# Patient Record
Sex: MALE | Race: BLACK OR AFRICAN AMERICAN | Employment: UNEMPLOYED | URBAN - METROPOLITAN AREA
[De-identification: names, ages, dates, MRNs, and addresses within clinical notes are randomized per-mention and may not be internally consistent; named-entity substitution may affect disease eponyms.]

---

## 2024-04-27 ENCOUNTER — HOSPITAL ENCOUNTER (INPATIENT)
Facility: HOSPITAL | Age: 54
LOS: 3 days | Discharge: HOME | End: 2024-04-30
Attending: STUDENT IN AN ORGANIZED HEALTH CARE EDUCATION/TRAINING PROGRAM | Admitting: INTERNAL MEDICINE
Payer: MEDICARE

## 2024-04-27 DIAGNOSIS — D86.9 SARCOIDOSIS: ICD-10-CM

## 2024-04-27 DIAGNOSIS — M25.50 POLYARTHRALGIA: Primary | ICD-10-CM

## 2024-04-27 LAB
ALBUMIN SERPL-MCNC: 4.2 G/DL (ref 3.2–4.8)
ALBUMIN/GLOB SERPL: 1.3 {RATIO} (ref 1–2)
ALP LIVER SERPL-CCNC: 100 U/L
ALT SERPL-CCNC: 56 U/L
ANION GAP SERPL CALC-SCNC: 4 MMOL/L (ref 0–18)
AST SERPL-CCNC: 77 U/L (ref ?–34)
BASOPHILS # BLD AUTO: 0.01 X10(3) UL (ref 0–0.2)
BASOPHILS NFR BLD AUTO: 0.1 %
BILIRUB SERPL-MCNC: 0.7 MG/DL (ref 0.3–1.2)
BUN BLD-MCNC: 21 MG/DL (ref 9–23)
BUN/CREAT SERPL: 15.4 (ref 10–20)
CALCIUM BLD-MCNC: 9.5 MG/DL (ref 8.7–10.4)
CHLORIDE SERPL-SCNC: 105 MMOL/L (ref 98–112)
CK SERPL-CCNC: 713 U/L
CO2 SERPL-SCNC: 30 MMOL/L (ref 21–32)
CREAT BLD-MCNC: 1.36 MG/DL
DEPRECATED RDW RBC AUTO: 40.1 FL (ref 35.1–46.3)
EGFRCR SERPLBLD CKD-EPI 2021: 63 ML/MIN/1.73M2 (ref 60–?)
EOSINOPHIL # BLD AUTO: 0.08 X10(3) UL (ref 0–0.7)
EOSINOPHIL NFR BLD AUTO: 1.1 %
ERYTHROCYTE [DISTWIDTH] IN BLOOD BY AUTOMATED COUNT: 12.8 % (ref 11–15)
ERYTHROCYTE [SEDIMENTATION RATE] IN BLOOD: 25 MM/HR
GLOBULIN PLAS-MCNC: 3.3 G/DL (ref 2.8–4.4)
GLUCOSE BLD-MCNC: 106 MG/DL (ref 70–99)
GLUCOSE BLDC GLUCOMTR-MCNC: 110 MG/DL (ref 70–99)
HCT VFR BLD AUTO: 44.2 %
HGB BLD-MCNC: 14 G/DL
IMM GRANULOCYTES # BLD AUTO: 0.03 X10(3) UL (ref 0–1)
IMM GRANULOCYTES NFR BLD: 0.4 %
LYMPHOCYTES # BLD AUTO: 1.39 X10(3) UL (ref 1–4)
LYMPHOCYTES NFR BLD AUTO: 19.8 %
MCH RBC QN AUTO: 27.1 PG (ref 26–34)
MCHC RBC AUTO-ENTMCNC: 31.7 G/DL (ref 31–37)
MCV RBC AUTO: 85.5 FL
MONOCYTES # BLD AUTO: 1 X10(3) UL (ref 0.1–1)
MONOCYTES NFR BLD AUTO: 14.2 %
NEUTROPHILS # BLD AUTO: 4.51 X10 (3) UL (ref 1.5–7.7)
NEUTROPHILS # BLD AUTO: 4.51 X10(3) UL (ref 1.5–7.7)
NEUTROPHILS NFR BLD AUTO: 64.4 %
OSMOLALITY SERPL CALC.SUM OF ELEC: 291 MOSM/KG (ref 275–295)
PLATELET # BLD AUTO: 184 10(3)UL (ref 150–450)
POTASSIUM SERPL-SCNC: 4.5 MMOL/L (ref 3.5–5.1)
PROT SERPL-MCNC: 7.5 G/DL (ref 5.7–8.2)
RBC # BLD AUTO: 5.17 X10(6)UL
SODIUM SERPL-SCNC: 139 MMOL/L (ref 136–145)
TROPONIN I SERPL HS-MCNC: 8 NG/L
WBC # BLD AUTO: 7 X10(3) UL (ref 4–11)

## 2024-04-27 RX ORDER — MORPHINE SULFATE 4 MG/ML
4 INJECTION, SOLUTION INTRAMUSCULAR; INTRAVENOUS ONCE
Status: COMPLETED | OUTPATIENT
Start: 2024-04-27 | End: 2024-04-27

## 2024-04-27 RX ORDER — METHYLPREDNISOLONE SODIUM SUCCINATE 125 MG/2ML
125 INJECTION, POWDER, LYOPHILIZED, FOR SOLUTION INTRAMUSCULAR; INTRAVENOUS ONCE
Status: COMPLETED | OUTPATIENT
Start: 2024-04-27 | End: 2024-04-27

## 2024-04-27 RX ORDER — LATANOPROST 50 UG/ML
1 SOLUTION/ DROPS OPHTHALMIC NIGHTLY
COMMUNITY
Start: 2024-03-11 | End: 2027-02-24

## 2024-04-27 RX ORDER — NIFEDIPINE 30 MG
30 TABLET, EXTENDED RELEASE ORAL DAILY
COMMUNITY
Start: 2024-01-02

## 2024-04-27 NOTE — ED PROVIDER NOTES
Patient Seen in: [facility] Emergency Department      History     Chief Complaint   Patient presents with    Syncope     Stated Complaint:     Subjective:   HPI    52 yo male with hx sarcoidosis, hypertension hyperlipidemia PE on Xarelto presenting for evaluation of a syncopal episode diffuse bodyaches and pains.  He is brought in by EMS from the scene.  He was at [employer] and felt very lightheaded and laid back.  He denies actual loss of consciousness.  States that over the past day he has had increasing diffuse extremity pain.  Mostly in shoulders and bilateral legs.  Difficulty walking due to his symptoms.  Denies headache blurry vision vomiting or numbness or weakness or tingling.  On pred 10mg/5mg alternating daily and rituxan infusions for sarcoid with multisystem involvement. Follows with rheum at [facility]    Objective:   Past Medical History:    Essential hypertension    History of blood clots    Hyperlipidemia    Pulmonary embolism (HCC)    Sarcoidosis, unspecified              History reviewed. No pertinent surgical history.             Social History     Socioeconomic History    Marital status: Single   Tobacco Use    Smoking status: Former     Types: Cigarettes    Smokeless tobacco: Never   Substance and Sexual Activity    Alcohol use: Not Currently    Drug use: Not Currently              Review of Systems    Positive for stated complaint:   Other systems are as noted in HPI.  Constitutional and vital signs reviewed.      All other systems reviewed and negative except as noted above.    Physical Exam     ED Triage Vitals   BP 04/27/24 1657 (!) 138/101   Pulse 04/27/24 1657 90   Resp 04/27/24 1657 17   Temp 04/27/24 1658 98.2 °F (36.8 °C)   Temp src 04/27/24 1658 Oral   SpO2 04/27/24 1657 94 %   O2 Device 04/27/24 1657 None (Room air)       Current:/80   Pulse 104   Temp 98.2 °F (36.8 °C) (Oral)   Resp 21   Ht 182.9 cm (6')   Wt (!) 142.9 kg   SpO2 100%   BMI 42.72 kg/m²          Physical Exam    Constitutional: awake, alert, no sig distress  HENT: mmm, no lesions,  Neck: normal range of motion, no tenderness, supple.  Eyes: PERRL, EOMI, conjunctiva normal, no discharge. Sclera anicteric.  Cardiovascular: rr no murmur  Respiratory: Normal breath sounds, no respiratory distress, no wheezing, no chest tenderness.  GI: Bowel sounds normal, Soft, no tenderness, no masses, no pulsatile masses.  : No CVA tenderness.  Skin: Warm, dry, no erythema, no rash.  Musculoskeletal: Intact distal pulses, no edema, no tenderness, no cyanosis, no clubbing. Good range of motion in all major joints. No tenderness to palpation or major deformities noted. Back- No tenderness.  Neurologic: Alert & oriented x 3, normal motor function, normal sensory function, no focal deficits noted.  Psych: Calm, cooperative, nl affect        ED Course     Labs Reviewed   COMP METABOLIC PANEL (14) - Abnormal; Notable for the following components:       Result Value    Glucose 106 (*)     Creatinine 1.36 (*)     ALT 56 (*)     AST 77 (*)     All other components within normal limits   CK CREATINE KINASE (NOT CREATININE) - Abnormal; Notable for the following components:     (*)     All other components within normal limits   SED RATE, WESTERGREN (AUTOMATED) - Abnormal; Notable for the following components:    Sed Rate 25 (*)     All other components within normal limits   POCT GLUCOSE - Abnormal; Notable for the following components:    POC Glucose  110 (*)     All other components within normal limits   TROPONIN I HIGH SENSITIVITY - Normal   CBC WITH DIFFERENTIAL WITH PLATELET    Narrative:     The following orders were created for panel order CBC With Differential With Platelet.  Procedure                               Abnormality         Status                     ---------                               -----------         ------                     CBC W/ DIFFERENTIAL[[id number]]                              Final  result                 Please view results for these tests on the individual orders.   RAINBOW DRAW LAVENDER   RAINBOW DRAW LIGHT GREEN   RAINBOW DRAW BLUE   RAINBOW DRAW GOLD   CBC W/ DIFFERENTIAL     EKG    Rate, intervals and axes as noted on EKG Report.  Rate: 86  Rhythm: Sinus Rhythm  Reading: SR w/ PACS, normal axis/intervals, no st or t wave changes, no stemi                             MDM      53-year-old male with history as document above presenting with diffuse bodyaches and joint pains and a near syncopal episode.  On arrival vitals are stable and reassuring.  Patient has significant pain the appearance of some weakness mostly in the shoulder and hip girdle.  This raises concern for polymyalgia rheumatica versus sarcoid, versus myositis or rhabdomyolysis  Plan labs and troponin and CPK and reassess after fluids and morphine  Admission disposition: 4/27/2024  9:08 PM           Significant pain persist after morphine and fluids, unable to ambulate.  Given amatory dysfunction we will opt for admission.  Discussed with rheumatology recommends Solu-Medrol IV and repeat CPK and aldolase in AM.  Discussed with hospitalist as well.                             Medical Decision Making      Disposition and Plan     Clinical Impression:  1. Polyarthralgia    2. Sarcoidosis         Disposition:  Admit  4/27/2024  9:08 pm    Follow-up:  No follow-up provider specified.        Medications Prescribed:  There are no discharge medications for this patient.                        Hospital Problems       Present on Admission             ICD-10-CM Noted POA    Polyarthralgia M25.50 4/27/2024 Unknown

## 2024-04-27 NOTE — ED INITIAL ASSESSMENT (HPI)
Pt presents via EMS s/p feeling dizzy and weak while at [employer]. Pt also reports extreme pain with raising bilateral arms xtoday. Pt reports syncopal episode lasting approximately one minute, denies head injury.  +coumadin use

## 2024-04-28 LAB
ANION GAP SERPL CALC-SCNC: 7 MMOL/L (ref 0–18)
ATRIAL RATE: 86 BPM
BUN BLD-MCNC: 15 MG/DL (ref 9–23)
BUN/CREAT SERPL: 15.3 (ref 10–20)
CALCIUM BLD-MCNC: 9.4 MG/DL (ref 8.7–10.4)
CHLORIDE SERPL-SCNC: 104 MMOL/L (ref 98–112)
CK SERPL-CCNC: 412 U/L
CK SERPL-CCNC: 729 U/L
CO2 SERPL-SCNC: 25 MMOL/L (ref 21–32)
CREAT BLD-MCNC: 0.98 MG/DL
CRP SERPL-MCNC: 17.5 MG/DL (ref ?–1)
EGFRCR SERPLBLD CKD-EPI 2021: 93 ML/MIN/1.73M2 (ref 60–?)
GLUCOSE BLD-MCNC: 163 MG/DL (ref 70–99)
LDH SERPL L TO P-CCNC: 275 U/L
OSMOLALITY SERPL CALC.SUM OF ELEC: 286 MOSM/KG (ref 275–295)
P AXIS: 55 DEGREES
P-R INTERVAL: 140 MS
POTASSIUM SERPL-SCNC: 4.3 MMOL/L (ref 3.5–5.1)
Q-T INTERVAL: 330 MS
QRS DURATION: 72 MS
QTC CALCULATION (BEZET): 394 MS
R AXIS: 65 DEGREES
RHEUMATOID FACT SERPL-ACNC: <10 IU/ML (ref ?–14)
SODIUM SERPL-SCNC: 136 MMOL/L (ref 136–145)
T AXIS: 74 DEGREES
VENTRICULAR RATE: 86 BPM

## 2024-04-28 PROCEDURE — 99223 1ST HOSP IP/OBS HIGH 75: CPT | Performed by: INTERNAL MEDICINE

## 2024-04-28 RX ORDER — LATANOPROST 50 UG/ML
1 SOLUTION/ DROPS OPHTHALMIC NIGHTLY
Status: DISCONTINUED | OUTPATIENT
Start: 2024-04-28 | End: 2024-04-30

## 2024-04-28 RX ORDER — DIPHENHYDRAMINE HCL 25 MG
50 CAPSULE ORAL EVERY 8 HOURS PRN
Status: DISCONTINUED | OUTPATIENT
Start: 2024-04-28 | End: 2024-04-30

## 2024-04-28 RX ORDER — NIFEDIPINE 30 MG/1
30 TABLET, EXTENDED RELEASE ORAL DAILY
Status: DISCONTINUED | OUTPATIENT
Start: 2024-04-28 | End: 2024-04-30

## 2024-04-28 RX ORDER — PANTOPRAZOLE SODIUM 40 MG/1
40 TABLET, DELAYED RELEASE ORAL
Status: DISCONTINUED | OUTPATIENT
Start: 2024-04-29 | End: 2024-04-30

## 2024-04-28 RX ORDER — METHYLPREDNISOLONE SODIUM SUCCINATE 125 MG/2ML
60 INJECTION, POWDER, LYOPHILIZED, FOR SOLUTION INTRAMUSCULAR; INTRAVENOUS EVERY 12 HOURS
Status: DISCONTINUED | OUTPATIENT
Start: 2024-04-28 | End: 2024-04-30

## 2024-04-28 RX ORDER — ACETAMINOPHEN 500 MG
1000 TABLET ORAL EVERY 8 HOURS PRN
Status: DISCONTINUED | OUTPATIENT
Start: 2024-04-28 | End: 2024-04-30

## 2024-04-28 RX ORDER — PREDNISONE 5 MG/1
5 TABLET ORAL DAILY
Status: ON HOLD | COMMUNITY
End: 2024-04-28

## 2024-04-28 RX ORDER — PANTOPRAZOLE SODIUM 40 MG/1
40 TABLET, DELAYED RELEASE ORAL
COMMUNITY

## 2024-04-28 RX ORDER — HYDROCODONE BITARTRATE AND ACETAMINOPHEN 5; 325 MG/1; MG/1
1 TABLET ORAL EVERY 6 HOURS PRN
Status: DISCONTINUED | OUTPATIENT
Start: 2024-04-28 | End: 2024-04-30

## 2024-04-28 NOTE — PHYSICAL THERAPY NOTE
PHYSICAL THERAPY EVALUATION - INPATIENT     Room Number: 543/543-A  Evaluation Date: 4/28/2024  Type of Evaluation: Initial   Physician Order: PT Eval and Treat    Presenting Problem: sacroidosis flare     Reason for Therapy: Mobility Dysfunction and Discharge Planning    PHYSICAL THERAPY ASSESSMENT   Patient is a 53 year old male admitted 4/27/2024 for polyarthaglia due to sacrcoidosis flare.  Prior to admission, patient's baseline is modif independent.  Patient is currently functioning below baseline with bed mobility, transfers, gait, stair negotiation, standing prolonged periods, and performing household tasks.  Patient is requiring maximum assist as a result of the following impairments: decreased functional strength, decreased endurance/aerobic capacity, pain, impaired standing balance, impaired motor planning, and decreased muscular endurance.  Physical Therapy will continue to follow for duration of hospitalization.    Patient will benefit from continued skilled PT Services to promote return to prior level of function and safety with continuous assistance and gradual rehabilitative therapy .    PLAN  PT Treatment Plan: Bed mobility;Energy conservation;Gait training  Rehab Potential : Fair  Frequency (Obs): 5x/week    PHYSICAL THERAPY MEDICAL/SOCIAL HISTORY   History related to current admission: EMS admit with syncope, weakkness.  C/o BUE and BLE pain.  Hx chronic RLE weakness- was to start OP PT next week     Problem List  Principal Problem:    Polyarthralgia  Active Problems:    Sarcoidosis      HOME SITUATION  Home Situation  Type of Home: Apartment  Stairs to Enter : 4  Lives With: Significant other  Drives: No  Patient Owned Equipment: Cane     Prior Level of [place]: lives with GF in 4 step apt. Has cane.  Unable to drive. Reports chronic RLE weakness.  On disability and gen weakness but BUE and BLE pain is new.  Reports 4 falls over last 6 months and fear of falling.    SUBJECTIVE  My body  mihaela    PHYSICAL THERAPY EXAMINATION   OBJECTIVE  Precautions: Limb alert - left;Limb alert - right  Fall Risk: High fall risk    WEIGHT BEARING RESTRICTION                   PAIN ASSESSMENT  Ratin  Location: BUE, BLE  Management Techniques: Activity promotion    COGNITION  Overall Cognitive Status:  WFL - within functional limits    RANGE OF MOTION AND STRENGTH ASSESSMENT  Upper extremity ROM and strength are within functional limits except for the following:  BUE  Lower extremity ROM is within functional limits except for the following: BLE  Lower extremity strength is within functional limits except for the following:  BLE    BALANCE  Static Sitting: Fair  Dynamic Sitting: Fair -  Static Standing: Poor  Dynamic Standing: Poor -    ADDITIONAL TESTS                                    NEUROLOGICAL FINDINGS                      ACTIVITY TOLERANCE                         O2 WALK       AM-PAC '6-Clicks' INPATIENT SHORT FORM - BASIC MOBILITY  How much difficulty does the patient currently have...  Patient Difficulty: Turning over in bed (including adjusting bedclothes, sheets and blankets)?: A Lot   Patient Difficulty: Sitting down on and standing up from a chair with arms (e.g., wheelchair, bedside commode, etc.): A Lot   Patient Difficulty: Moving from lying on back to sitting on the side of the bed?: A Lot   How much help from another person does the patient currently need...   Help from Another: Moving to and from a bed to a chair (including a wheelchair)?: A Lot   Help from Another: Need to walk in hospital room?: A Lot   Help from Another: Climbing 3-5 steps with a railing?: Total     AM-PAC Score:  Raw Score: 11   Approx Degree of Impairment: 72.57%   Standardized Score (AM-PAC Scale): 33.86   CMS Modifier (G-Code): CL    FUNCTIONAL ABILITY STATUS  Functional Mobility/Gait Assessment  Gait Assistance: Maximum assistance  Distance (ft): 5  Assistive Device: Rolling walker  Pattern: Shuffle  Rolling: maximum  assist  Supine to Sit: maximum assist  Sit to Supine: maximum assist  Sit to Stand: maximum assist    Exercise/Education Provided:  Bed mobility  Body mechanics  Energy conservation  Gait training  Neuromuscular re-educate  ROM  Transfer training    The patient's Approx Degree of Impairment: 72.57% has been calculated based on documentation in the [facility] '6 clicks' Inpatient Basic Mobility Short Form.  Research supports that patients with this level of impairment may benefit from GR  - patient has high fall risk with 4 falls over last 6 months, was able to walk with cane, now unable to walk > 5 ft with max A for all ADLs.  Final disposition will be made by interdisciplinary medical team.    Patient End of Session: Up in chair;Needs met;Call light within reach;RN aware of session/findings;All patient questions and concerns addressed;Alarm set    CURRENT GOALS  Goals to be met by: 5/5/2024  Patient Goal Patient's self-stated goal is: to get stronger   Goal #1 Patient is able to demonstrate supine - sit EOB @ level: independent     Goal #1   Current Status    Goal #2 Patient is able to demonstrate transfers EOB to/from [facility] at assistance level: independent with none     Goal #2  Current Status    Goal #3 Patient is able to ambulate 400 feet with assist device: walker - rolling at assistance level: modified independent   Goal #3   Current Status    Goal #4 Patient will negotiate 4 stairs/one curb w/ assistive device and supervision   Goal #4   Current Status    Goal #5 Patient to demonstrate independence with home activity/exercise instructions provided to patient in preparation for discharge.   Goal #5   Current Status    Goal #6    Goal #6  Current Status      Patient Evaluation Complexity Level:  History Moderate - 1 or 2 personal factors and/or co-morbidities   Examination of body systems Moderate - addressing a total of 3 or more elements   Clinical Presentation  Moderate - Evolving   Clinical Decision Making   Moderate Complexity     Gait Trainin minutes  Therapeutic Activity:  30 minutes  Neuromuscular Re-education: 0 minutes  Therapeutic Exercise: 0 minutes  [place] Repositionin minutes  Manual Therapy: 0 minutes  Can add/delete any of these

## 2024-04-28 NOTE — PLAN OF CARE
Problem: Patient Centered Care  Goal: Patient preferences are identified and integrated in the patient's plan of care  Description: Interventions:  - What would you like us to know as we care for you? I live home alone  - Provide timely, complete, and accurate information to patient/family  - Incorporate patient and family knowledge, values, beliefs, and cultural backgrounds into the planning and delivery of care  - Encourage patient/family to participate in care and decision-making at the level they choose  - Honor patient and family perspectives and choices  Outcome: Progressing      Problem: PAIN - ADULT  Goal: Verbalizes/displays adequate comfort level or patient's stated pain goal  Description: INTERVENTIONS:  - Encourage pt to monitor pain and request assistance  - Assess pain using appropriate pain scale  - Administer analgesics based on type and severity of pain and evaluate response  - Implement non-pharmacological measures as appropriate and evaluate response  - Consider cultural and social influences on pain and pain management  - Manage/alleviate anxiety  - Utilize distraction and/or relaxation techniques  - Monitor for opioid side effects  - Notify MD/LIP if interventions unsuccessful or patient reports new pain  - Anticipate increased pain with activity and pre-medicate as appropriate  Outcome: Progressing     Problem: SAFETY ADULT - FALL  Goal: Free from fall injury  Description: INTERVENTIONS:  - Assess pt frequently for physical needs  - Identify cognitive and physical deficits and behaviors that affect risk of falls.  - [place] fall precautions as indicated by assessment.  - Educate pt/family on patient safety including physical limitations  - Instruct pt to call for assistance with activity based on assessment  - Modify environment to reduce risk of injury  - Provide assistive devices as appropriate  - Consider OT/PT consult to assist with strengthening/mobility  - Encourage toileting  schedule  Outcome: Progressing

## 2024-04-28 NOTE — PLAN OF CARE
Pt admitted from ED, sister at bedside. Per pt he normally ambulates with a cane. Complains of generalized weakness. Pt does not remember what meds he takes, med req not completed d/t pharmacy being closed, pt not remembering what he takes. MD notified. AM RN notified. Safety precautions in place, call light within reach.     Problem: Patient Centered Care  Goal: Patient preferences are identified and integrated in the patient's plan of care  Description: Interventions:  - What would you like us to know as we care for you? I live home alone  - Provide timely, complete, and accurate information to patient/family  - Incorporate patient and family knowledge, values, beliefs, and cultural backgrounds into the planning and delivery of care  - Encourage patient/family to participate in care and decision-making at the level they choose  - Honor patient and family perspectives and choices  Outcome: Progressing     Problem: Patient/Family Goals  Goal: Patient/Family Long Term Goal  Description: Patient's Long Term Goal:     Interventions:  -   - See additional Care Plan goals for specific interventions  Outcome: Progressing  Goal: Patient/Family Short Term Goal  Description: Patient's Short Term Goal:     Interventions:   - - See additional Care Plan goals for specific interventions  Outcome: Progressing     Problem: PAIN - ADULT  Goal: Verbalizes/displays adequate comfort level or patient's stated pain goal  Description: INTERVENTIONS:  - Encourage pt to monitor pain and request assistance  - Assess pain using appropriate pain scale  - Administer analgesics based on type and severity of pain and evaluate response  - Implement non-pharmacological measures as appropriate and evaluate response  - Consider cultural and social influences on pain and pain management  - Manage/alleviate anxiety  - Utilize distraction and/or relaxation techniques  - Monitor for opioid side effects  - Notify MD/LIP if interventions unsuccessful or  patient reports new pain  - Anticipate increased pain with activity and pre-medicate as appropriate  Outcome: Progressing     Problem: SAFETY ADULT - FALL  Goal: Free from fall injury  Description: INTERVENTIONS:  - Assess pt frequently for physical needs  - Identify cognitive and physical deficits and behaviors that affect risk of falls.  - [place] fall precautions as indicated by assessment.  - Educate pt/family on patient safety including physical limitations  - Instruct pt to call for assistance with activity based on assessment  - Modify environment to reduce risk of injury  - Provide assistive devices as appropriate  - Consider OT/PT consult to assist with strengthening/mobility  - Encourage toileting schedule  Outcome: Progressing

## 2024-04-28 NOTE — ED QUICK NOTES
Orders for admission, patient is aware of plan and ready to go upstairs. Any questions, please call ED RN Adriana at extension [phone].     Patient Covid vaccination status: Fully vaccinated     COVID Test Ordered in ED: None    COVID Suspicion at Admission: N/A    Running Infusions:      Mental Status/LOC at time of transport: AOx4    Other pertinent information: from home,   CIWA score: N/A   NIH score:  N/A

## 2024-04-28 NOTE — H&P
[facility]  part of [facility]    History and Physical    Srinivasan Shin Patient Status:  Inpatient    10/3/1970 MRN [id number]   Location [facility] 5SW/SE Attending Stalin Pang MD   Hosp Day # 1 PCP No primary care provider on file.     Date:  2024  Date of Admission:  2024    History provided by:patient  HPI:     Chief Complaint   Patient presents with    Syncope     52 yo male with hx sarcoidosis, hypertension hyperlipidemia PE on Xarelto presenting for evaluation of a syncopal episode diffuse bodyaches and pains.  Patient  felt very lightheaded and laid back.  He denies actual loss of consciousness.  Patient c/o diffuse body aches  mainly  in shoulders and bilateral legs.  Difficulty walking due to his symptoms.  Denies headache blurry vision vomiting or numbness or weakness or tingling. Patient is admitted for further management          History     Past Medical History:    Essential hypertension    High blood pressure    History of blood clots    Hyperlipidemia    Pulmonary embolism (HCC)    Sarcoidosis, unspecified     History reviewed. No pertinent surgical history.  History reviewed. No pertinent family history.  Social History:  Social History     Socioeconomic History    Marital status: Single   Tobacco Use    Smoking status: Former     Current packs/day: 0.00     Types: Cigarettes     Quit date: 2020     Years since quittin.3    Smokeless tobacco: Never   Substance and Sexual Activity    Alcohol use: Not Currently    Drug use: Not Currently     Social Determinants of Health     Food Insecurity: No Food Insecurity (2024)    Food Insecurity     Food Insecurity: Never true   Transportation Needs: No Transportation Needs (2024)    Transportation Needs     Lack of Transportation: No   Housing Stability: Low Risk  (2024)    Housing Stability     Housing Instability: No     Allergies/Medications:   Allergies: No Known Allergies  Medications Prior  to Admission   Medication Sig    pantoprazole 40 MG Oral Tab EC Take 1 tablet (40 mg total) by mouth every morning before breakfast.    predniSONE 5 MG Oral Tab Take 1 tablet (5 mg total) by mouth daily.    rivaroxaban (XARELTO) 20 MG Oral Tab Take 1 tablet (20 mg total) by mouth daily with food.    Acetaminophen 500 MG Oral Cap Take 2 capsules (1,000 mg total) by mouth every 8 (eight) hours as needed for Pain.    latanoprost 0.005 % Ophthalmic Solution Place 1 drop into both eyes nightly.    NIFEdipine ER 30 MG Oral Tablet 24 Hr Take 1 tablet (30 mg total) by mouth daily.       Review of Systems:     Constitutional: Negative.    HENT: Negative.     Eyes: Negative.    Respiratory: Negative.     Cardiovascular: Negative.    Gastrointestinal: Negative.    Endocrine: Negative.    Genitourinary: Negative.    Musculoskeletal:  Positive for myalgias.   Skin: Negative.    Allergic/Immunologic: Negative.    Neurological: Negative.    Hematological: Negative.    Psychiatric/Behavioral: Negative.         Physical Exam:   Vital Signs:  Blood pressure 144/85, pulse 87, temperature 97.9 °F (36.6 °C), temperature source Oral, resp. rate 18, height 6' (1.829 m), weight (!) 313 lb 12.8 oz (142.3 kg), SpO2 93%.  Physical Exam  Vitals and nursing note reviewed.   Constitutional:       Appearance: Normal appearance.   HENT:      Head: Normocephalic and atraumatic.      Mouth/Throat:      Mouth: Mucous membranes are moist.   Eyes:      Pupils: Pupils are equal, round, and reactive to light.   Cardiovascular:      Rate and Rhythm: Normal rate and regular rhythm.      Heart sounds: Normal heart sounds.   Pulmonary:      Effort: Pulmonary effort is normal.   Abdominal:      General: Abdomen is flat. Bowel sounds are normal.      Palpations: Abdomen is soft.   Musculoskeletal:         General: Normal range of motion.      Cervical back: Neck supple.   Skin:     General: Skin is warm.   Neurological:      General: No focal deficit present.       Mental Status: He is alert.   Psychiatric:         Mood and Affect: Mood normal.           Results:     Lab Results   Component Value Date    WBC 7.0 04/27/2024    HGB 14.0 04/27/2024    HCT 44.2 04/27/2024    .0 04/27/2024    CREATSERUM 0.98 04/28/2024    BUN 15 04/28/2024     04/28/2024    K 4.3 04/28/2024     04/28/2024    CO2 25.0 04/28/2024     (H) 04/28/2024    CA 9.4 04/28/2024    ALB 4.2 04/27/2024    ALKPHO 100 04/27/2024    BILT 0.7 04/27/2024    TP 7.5 04/27/2024    AST 77 (H) 04/27/2024    ALT 56 (H) 04/27/2024    ESRML 25 (H) 04/27/2024    TROPHS 8 04/27/2024     (H) 04/28/2024     No results found.  EKG 12 Lead    Result Date: 4/28/2024  Sinus rhythm with Premature supraventricular complexes Otherwise normal ECG No previous ECGs found in [facility] Confirmed by RUPINDER SINGER, ARIANA ([id number]) on 4/28/2024 9:38:59 AM     Assessment/Plan:     Polyarthralgia/ Sarcoidosis flare up, seen by Rheumatology started on Solumedrol  HTN BP stable  HLD on statin  H/O PE on Xarelto  D/W RN  Pt seen by PT recommend SNIF  MDM  Reviewed previous: labs  Total time providing critical care:  minutes. This excludes time spent performing separately reportable procedures and services.  Consults: primary care provider (Rheumatology)                     PAULINO GUERRERO, MD [place]  4/28/2024

## 2024-04-28 NOTE — CONSULTS
[facility]  part of [facility]    Report of Consultation    Srinivasan Shin Patient Status:  Inpatient    10/3/1970 MRN [id number]   Location [facility] 5SW/SE Attending Stalin Pang MD   Hosp Day # 1 PCP No primary care provider on file.     Date of Admission:  2024  Date of Consult:  2024    Reason for Consultation:   sarcoidosis    History of Present Illness:   Patient is a 53 year old male who was admitted to the hospital for Polyarthralgia:    He has hx of Sarcoidosis: with neurologic, mediastinal/hilar LIU, cutaneous (lupus pernio), liver involvement and Right Cavernous Sinus Mass- Diagnosed in 2019; primarily ocular and neurosarcoidosis. Also hx of hypertension hyperlipidemia PE on Xarelto presenting for evaluation of a syncopal episode diffuse bodyaches and pains   He last saw his rheumatologist on 3/15/2024 Dr. Taco Frederick.  He's been on prednisone 10mg/alternating with 5mg daily and he just received rituxin on 2024.  He was having sudden onset of joint pain yesterday morning that worsened. He suddenly was not able to move his legs in [employer] and fell back. He as brought to the ER and notable for weakness in his upper extermities and lower extermities with severe joint pain in his arms, leg and feet. He has numbness and tingling in his hands and feet that is sowewhat improving. He was not able to move his arms up but now able to do this with his right arm and right leg better. He cont. [address] pain and weakness in his left arm and left leg. He has pain on the back of his left knee on raising it.   He has diffuse prurutisi.   He has no sob, no cough,  He has no headache.   He has poor vision in his right eye and good vision in his left eye after his dx of sarcoid. He states his vision has been the same now.   He had a rash that was raised and red coming and going for the last 1-2 days. Hx of lupus pernio  He usually gets joint pain flares with his sarcoid.  He gets hosptilzied very 5 months with a flare at times.   He did receive rituxin on  without issues.   He received solumedrol 125mg iv x 1 in the ER.   He also had an elevated CK of 700, esr of 25mm/hr. And cr of 1.36.   No recent fevers or infections.   He has no abdominal pain - he did get this when he had the dx of hepatic sarcoid.   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 He was given MTX but has cirrhosis on MRI so discontinued, May 2023, infliximab withdrawn because of anti-infliximab antibodies and low serum infliximab level combined with sarcoid progression (patient was noted to have Infliximab antibodies.  Recently hospitalized 2023 with decreased vision of the right eye , s/p pulse steroid and 1 dose of rituximab . with significant improvement.  First dose of rituximab 10/2/2023 as inpatient  Received second dose rituximab in 10/16/2023 in infusion center  *right eye vision loss might be permanent high dose pred with substantial risk  Plan was for Third dose Rituximab which he receieved on 2024      Past Medical History  Past Medical History:    Essential hypertension    High blood pressure    History of blood clots    Hyperlipidemia    Pulmonary embolism (HCC)    Sarcoidosis, unspecified       Past Surgical History  History reviewed. No pertinent surgical history.    Family History  History reviewed. No pertinent family history.    Social History  Social History     Socioeconomic History    Marital status: Single   Tobacco Use    Smoking status: Former     Current packs/day: 0.00     Types: Cigarettes     Quit date:      Years since quittin.3    Smokeless tobacco: Never   Substance and Sexual Activity    Alcohol use: Not Currently    Drug use: Not Currently     Social Determinants of Health     Food  Insecurity: No Food Insecurity (4/27/2024)    Food Insecurity     Food Insecurity: Never true   Transportation Needs: No Transportation Needs (4/27/2024)    Transportation Needs     Lack of Transportation: No   Housing Stability: Low Risk  (4/27/2024)    Housing Stability     Housing Instability: No        Current Medications:  No current facility-administered medications for this encounter.     Medications Prior to Admission   Medication Sig    Acetaminophen 500 MG Oral Cap Take 2 capsules (1,000 mg total) by mouth every 8 (eight) hours as needed for Pain.    latanoprost 0.005 % Ophthalmic Solution Place 1 drop into both eyes nightly.    NIFEdipine ER 30 MG Oral Tablet 24 Hr Take 1 tablet (30 mg total) by mouth daily.       Allergies  No Known Allergies    Review of Systems:    Review Of Systems:  Fatigue  Constitutional:No fever, no change in weight or appetitie  Derm: rashes, no oral ulcers, no alopecia, no photosensitivity, no psoriasis  HEENT: No dry eyes, no dry mouth, no Raynaud's, no nasal ulcers, no parotid swelling, no neck pain, no jaw pain, no temple pain  Eyes: No visual changes,   CVS: No chest pain, no heart disease  RS: No SOB, no Cough, No Pleurtic pain,   GI: No nausea, no vomiiting, no abominal pain, no hx of ulcer, no gastritis, no heartburn, no dyshpagia, no BRBPR or melena  : no dysuria,   Neuro: mild hand and feet numbness or tingling, no headache, no hx of seizures, no back pain   Psych: no hx of anxiety or depression  ENDO: no hx of thyroid disease, no hx of DM  Joint/Muscluskeltal: see HPI,   All other ROS are negative.       Physical Exam:   Blood pressure 144/85, pulse 87, temperature 97.9 °F (36.6 °C), temperature source Oral, resp. rate 18, height 6' (1.829 m), weight (!) 313 lb 12.8 oz (142.3 kg), SpO2 93%.    HEENT: Clear oropharynx, no oral ulcers, EOM intact, clear sclear, PERRLA, pleasant, no acute distress, no CAD, no neck tendnerness, good ROM,   No rashes  CVS: RRR, no  murmurs  RS: CTAB, no crackles, no rhonchi  ABD: Soft Non tender, no HSM felt, BS positive  Joint exam:   Tender in 1-5th mcps and pisp , mild swelling in hands   Can close them   Tender in left shoulder - can't abduct completely  Can raise his right shoulder easily   Not tender in hips   Tender in left knee and posterior- can raise his left leg onl 1-2 inches off the bed -   Not tender in right knee   Not tender in b/l ankles   Not tendern in b/l feet   No edema  Areas of pruritus noted      Results:     Laboratory Data:  Lab Results   Component Value Date    WBC 7.0 04/27/2024    HGB 14.0 04/27/2024    HCT 44.2 04/27/2024    .0 04/27/2024    CREATSERUM 1.36 (H) 04/27/2024    BUN 21 04/27/2024     04/27/2024    K 4.5 04/27/2024     04/27/2024    CO2 30.0 04/27/2024     (H) 04/27/2024    CA 9.5 04/27/2024    ALB 4.2 04/27/2024    ALKPHO 100 04/27/2024    TP 7.5 04/27/2024    AST 77 (H) 04/27/2024    ALT 56 (H) 04/27/2024    ESRML 25 (H) 04/27/2024    TROPHS 8 04/27/2024     (H) 04/28/2024         Imaging:  No results found.        Impression:         1. Sarcoidosis -  neurologic, mediastinal/hilar LIU, cutaneous (lupus pernio), liver involvement and Right Cavernous Sinus Mass- Diagnosed in 2019; primarily ocular and neurosarcoidosis.  - now with suddenly weakness and polyarthralgia - likely a sarcoidosis flare up   - cont. Solumedrol 60mg iv bid   - s/p rituxin 4/17/2024   - follow ck levels and aldolase - potentially myosiits involvement   - will have to consider neurologic flare as well - but he currently has no headache, no change in vision (poor vision in right eye) and improvement of his mobility - if he can't raise his left leg will need to consider mri lumbar spine or mri head - to eval this further - but can give one more day on steroids   - pruritus might be from the sarcoid - will cont. Steroids and benadryl will be added -     His treatment history:  He was deemed to be  adalimumab failure  Dx in 2019 - He was first started on pred and then MMF but flared and was transitioned to Infliximab infusions. He was given MTX but has cirrhosis on MRI so discontinued, May 2023, infliximab withdrawn because of anti-infliximab antibodies and low serum infliximab level combined with sarcoid progression (patient was noted to have Infliximab antibodies.  Recently hospitalized september 2023 with decreased vision of the right eye , s/p pulse steroid and 1 dose of rituximab . with significant improvement.  First dose of rituximab 10/2/2023 as inpatient  Received second dose rituximab in 10/16/2023 in infusion center  *right eye vision loss might be permanent high dose pred with substantial risk    2. Elevated creatinine - follow this , unclear what his baseline might be   3. Elevated liver tests - hx of hepatic sarcoid - unclear if this is the etiology -should follow -   4. Hx of PE - on xarelto           Thank you for allowing me to participate in the care of your patient.    Ruperto Aguilera MD  4/28/2024

## 2024-04-29 LAB
ALBUMIN SERPL-MCNC: 4 G/DL (ref 3.2–4.8)
ALBUMIN/GLOB SERPL: 1.1 {RATIO} (ref 1–2)
ALP LIVER SERPL-CCNC: 86 U/L
ALT SERPL-CCNC: 37 U/L
ANION GAP SERPL CALC-SCNC: 5 MMOL/L (ref 0–18)
AST SERPL-CCNC: 38 U/L (ref ?–34)
BILIRUB SERPL-MCNC: 0.5 MG/DL (ref 0.3–1.2)
BUN BLD-MCNC: 21 MG/DL (ref 9–23)
BUN/CREAT SERPL: 21.4 (ref 10–20)
CALCIUM BLD-MCNC: 9.8 MG/DL (ref 8.7–10.4)
CHLORIDE SERPL-SCNC: 103 MMOL/L (ref 98–112)
CO2 SERPL-SCNC: 27 MMOL/L (ref 21–32)
CREAT BLD-MCNC: 0.98 MG/DL
EGFRCR SERPLBLD CKD-EPI 2021: 93 ML/MIN/1.73M2 (ref 60–?)
GLOBULIN PLAS-MCNC: 3.5 G/DL (ref 2.8–4.4)
GLUCOSE BLD-MCNC: 192 MG/DL (ref 70–99)
NUCLEAR IGG TITR SER IF: NEGATIVE {TITER}
OSMOLALITY SERPL CALC.SUM OF ELEC: 288 MOSM/KG (ref 275–295)
POTASSIUM SERPL-SCNC: 4.6 MMOL/L (ref 3.5–5.1)
PROT SERPL-MCNC: 7.5 G/DL (ref 5.7–8.2)
SODIUM SERPL-SCNC: 135 MMOL/L (ref 136–145)

## 2024-04-29 PROCEDURE — 99233 SBSQ HOSP IP/OBS HIGH 50: CPT | Performed by: INTERNAL MEDICINE

## 2024-04-29 NOTE — PROGRESS NOTES
[facility]  part of [facility]    Progress Note    Srinivasan Shin Patient Status:  Inpatient    10/3/1970 MRN [id number]   Location [facility] 5SW/SE Attending Stalin Pang MD   Hosp Day # 2 PCP No primary care provider on file.     Subjective:   Srinivasan Shin is a(n) 53 year old male  Doing better - but still has trouble with his elft arm and left leg - still weaker than right     Objective:   Blood pressure 122/82, pulse 88, temperature 98 °F (36.7 °C), temperature source Oral, resp. rate 20, height 6' (1.829 m), weight (!) 313 lb 12.8 oz (142.3 kg), SpO2 91%.    HEENT: Clear oropharynx, no oral ulcers, EOM intact, clear sclear, PERRLA, pleasant, no acute distress, no CAD, no neck tendnerness, good ROM,   No rashes  CVS: RRR, no murmurs  RS: CTAB, no crackles, no rhonchi  ABD: Soft Non tender, no HSM felt, BS positive  Joint exam:   Left arm - can't abduct completey - up to 60 degrees   Left leg can raise better than yeter -0 b  Right leg raising much better       Results:   Lab Results   Component Value Date    WBC 7.0 2024    HGB 14.0 2024    HCT 44.2 2024    .0 2024    CREATSERUM 0.98 2024    BUN 21 2024     (L) 2024    K 4.6 2024     2024    CO2 27.0 2024     (H) 2024    CA 9.8 2024    ALB 4.0 2024    ALKPHO 86 2024    BILT 0.5 2024    TP 7.5 2024    AST 38 (H) 2024    ALT 37 2024    ESRML 25 (H) 2024    CRP 17.50 (H) 2024    TROPHS 8 2024     (H) 2024       No results found.        Assessment & Plan:        1. Sarcoidosis -  neurologic, mediastinal/hilar LIU, cutaneous (lupus pernio), liver involvement and Right Cavernous Sinus Mass- Diagnosed in 2019; primarily ocular and neurosarcoidosis.  - now with suddenly weakness and polyarthralgia - likely a sarcoidosis flare up   - cont. Solumedrol 60mg iv bid   - s/p  rituxin 4/17/2024   - follow ck levels and aldolase - ck is improving, potentially myosiits involvement   - will have to consider neurologic flare as well - but he currently has no headache, no change in vision (poor vision in right eye) and improvement of his mobility - he can raise his left leg will need to consider mri lumbar spine or mri head - to eval this further - but can give one more day on steroids   - pruritus might be from the sarcoid - will cont. 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 He was given MTX but has cirrhosis on MRI so discontinued, May 2023, infliximab withdrawn because of anti-infliximab antibodies and low serum infliximab level combined with sarcoid progression (patient was noted to have Infliximab antibodies.  Recently hospitalized september 2023 with decreased vision of the right eye , s/p pulse steroid and 1 dose of rituximab . with significant improvement.  First dose of rituximab 10/2/2023 as inpatient  Received second dose rituximab in 10/16/2023 in infusion center  *right eye vision loss might be permanent high dose pred with substantial risk     2. Elevated creatinine - follow this , unclear what his baseline might be - now normalized  3. Elevated liver tests - hx of hepatic sarcoid - unclear if this is the etiology -should follow - improving  4. Hx of PE - on serafin Aguilera MD  4/29/2024

## 2024-04-29 NOTE — PAYOR COMM NOTE
--------------  ADMISSION REVIEW     Payor: [facility]  Subscriber #:  [id number]  Authorization Number: [id number]    Admit date: 4/27/24  Admit time: 11:02 PM       REVIEW DOCUMENTATION:       Patient Seen in: [facility] Emergency Department      History     Chief Complaint   Patient presents with    Syncope     Stated Complaint:     Subjective:   HPI    54 yo male with hx sarcoidosis, hypertension hyperlipidemia PE on Xarelto presenting for evaluation of a syncopal episode diffuse bodyaches and pains.  He is brought in by EMS from the scene.  He was at [employer] and felt very lightheaded and laid back.  He denies actual loss of consciousness.  States that over the past day he has had increasing diffuse extremity pain.  Mostly in shoulders and bilateral legs.  Difficulty walking due to his symptoms.  Denies headache blurry vision vomiting or numbness or weakness or tingling.  On pred 10mg/5mg alternating daily and rituxan infusions for sarcoid with multisystem involvement. Follows with rheum at [facility]    Objective:   Past Medical History:    Essential hypertension    History of blood clots    Hyperlipidemia    Pulmonary embolism (HCC)    Sarcoidosis, unspecified   All other systems reviewed and negative except as noted above.    Physical Exam     ED Triage Vitals   BP 04/27/24 1657 (!) 138/101   Pulse 04/27/24 1657 90   Resp 04/27/24 1657 17   Temp 04/27/24 1658 98.2 °F (36.8 °C)   Temp src 04/27/24 1658 Oral   SpO2 04/27/24 1657 94 %   O2 Device 04/27/24 1657 None (Room air)       Current:/80   Pulse 104   Temp 98.2 °F (36.8 °C) (Oral)   Resp 21   Ht 182.9 cm (6')   Wt (!) 142.9 kg   SpO2 100%   BMI 42.72 kg/m²         Physical Exam    Constitutional: awake, alert, no sig distress  HENT: mmm, no lesions,  Neck: normal range of motion, no tenderness, supple.  Eyes: PERRL, EOMI, conjunctiva normal, no discharge. Sclera anicteric.  Cardiovascular: rr no murmur  Respiratory: Normal  breath sounds, no respiratory distress, no wheezing, no chest tenderness.  GI: Bowel sounds normal, Soft, no tenderness, no masses, no pulsatile masses.  : No CVA tenderness.  Skin: Warm, dry, no erythema, no rash.  Musculoskeletal: Intact distal pulses, no edema, no tenderness, no cyanosis, no clubbing. Good range of motion in all major joints. No tenderness to palpation or major deformities noted. Back- No tenderness.  Neurologic: Alert & oriented x 3, normal motor function, normal sensory function, no focal deficits noted.  Psych: Calm, cooperative, nl affect        ED Course     Labs Reviewed   COMP METABOLIC PANEL (14) - Abnormal; Notable for the following components:       Result Value    Glucose 106 (*)     Creatinine 1.36 (*)     ALT 56 (*)     AST 77 (*)     All other components within normal limits   CK CREATINE KINASE (NOT CREATININE) - Abnormal; Notable for the following components:     (*)     All other components within normal limits   SED RATE, WESTERGREN (AUTOMATED) - Abnormal; Notable for the following components:    Sed Rate 25 (*)     All other components within normal limits   POCT GLUCOSE - Abnormal; Notable for the following components:    POC Glucose  110 (*)     All other components within normal limits   TROPONIN I HIGH SENSITIVITY - Normal     EKG    Rate, intervals and axes as noted on EKG Report.  Rate: 86  Rhythm: Sinus Rhythm  Reading: SR w/ PACS, normal axis/intervals, no st or t wave changes, no stemi       Disposition and Plan     Clinical Impression:  1. Polyarthralgia    2. Sarcoidosis         Disposition:  Admit  4/27/2024  9:08 pm      Signed by Ildefonso Forbes MD on 4/27/2024  9:44 PM         methylPREDNISolone sodium succinate (Solu-MEDROL) injection 125 mg  Dose: 125 mg  Freq: Once Route: IV  Start: 04/27/24 2056 End: 04/27/24 2106     morphINE PF 4 MG/ML injection 4 mg  Dose: 4 mg  Freq: Once Route: IV  Start: 04/27/24 1737 End: 04/27/24 1747    X2   sodium  chloride 0.9 % IV bolus 1,000 mL  Dose: 1,000 mL  Freq: Once Route: IV  Last Dose: Stopped (04/27/24 1848)  Start: 04/27/24 1747 End: 04/27/24 1848         HISTORY AND PHYSICAL      52 yo male with hx sarcoidosis, hypertension hyperlipidemia PE on Xarelto presenting for evaluation of a syncopal episode diffuse bodyaches and pains. Patient felt very lightheaded and laid back. He denies actual loss of consciousness. Patient c/o diffuse body aches mainly in shoulders and bilateral legs. Difficulty walking due to his symptoms. Denies headache blurry vision vomiting or numbness or weakness or tingling. Patient is admitted for further management     Assessment/Plan:     Polyarthralgia/ Sarcoidosis flare up, seen by Rheumatology started on Solumedrol  HTN BP stable  HLD on statin  H/O PE on Xarelto  D/W RN  Pt seen by PT recommend SNIF      RHEUMATOLOGY CONSULT   4-28-24     Date of Admission:  4/27/2024  Date of Consult:  4/28/2024     Reason for Consultation:   sarcoidosis     History of Present Illness:   Patient is a 53 year old male who was admitted to the hospital for Polyarthralgia:     He has hx of Sarcoidosis: with neurologic, mediastinal/hilar LIU, cutaneous (lupus pernio), liver involvement and Right Cavernous Sinus Mass- Diagnosed in 2019; primarily ocular and neurosarcoidosis. Also hx of hypertension hyperlipidemia PE on Xarelto presenting for evaluation of a syncopal episode diffuse bodyaches and pains   He last saw his rheumatologist on 3/15/2024 Dr. Taco Frederick.  He's been on prednisone 10mg/alternating with 5mg daily and he just received rituxin on 4/17/2024.  He was having sudden onset of joint pain yesterday morning that worsened. He suddenly was not able to move his legs in [employer] and fell back. He as brought to the ER and notable for weakness in his upper extermities and lower extermities with severe joint pain in his arms, leg and feet. He has numbness and tingling in his hands and feet that is  michael improving. He was not able to move his arms up but now able to do this with his right arm and right leg better. He cont. [address] pain and weakness in his left arm and left leg. He has pain on the back of his left knee on raising it.     HEENT: Clear oropharynx, no oral ulcers, EOM intact, clear sclear, PERRLA, pleasant, no acute distress, no CAD, no neck tendnerness, good ROM,   No rashes  CVS: RRR, no murmurs  RS: CTAB, no crackles, no rhonchi  ABD: Soft Non tender, no HSM felt, BS positive  Joint exam:   Tender in 1-5th mcps and pisp , mild swelling in hands   Can close them   Tender in left shoulder - can't abduct completely  Can raise his right shoulder easily   Not tender in hips   Tender in left knee and posterior- can raise his left leg onl 1-2 inches off the bed -   Not tender in right knee   Not tender in b/l ankles   Not tendern in b/l feet   No edema  Areas of pruritus noted    Impression:           1. Sarcoidosis -  neurologic, mediastinal/hilar LIU, cutaneous (lupus pernio), liver involvement and Right Cavernous Sinus Mass- Diagnosed in 2019; primarily ocular and neurosarcoidosis.  - now with suddenly weakness and polyarthralgia - likely a sarcoidosis flare up   - cont. Solumedrol 60mg iv bid   - s/p rituxin 4/17/2024   - follow ck levels and aldolase - potentially myosiits involvement   - will have to consider neurologic flare as well - but he currently has no headache, no change in vision (poor vision in right eye) and improvement of his mobility - if he can't raise his left leg will need to consider mri lumbar spine or mri head - to eval this further - but can give one more day on steroids   - pruritus might be from the sarcoid - will cont. 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 He was given MTX but has cirrhosis  on MRI so discontinued, May 2023, infliximab withdrawn because of anti-infliximab antibodies and low serum infliximab level combined with sarcoid progression (patient was noted to have Infliximab antibodies.  Recently hospitalized september 2023 with decreased vision of the right eye , s/p pulse steroid and 1 dose of rituximab . with significant improvement.  First dose of rituximab 10/2/2023 as inpatient  Received second dose rituximab in 10/16/2023 in infusion center  *right eye vision loss might be permanent high dose pred with substantial risk     2. Elevated creatinine - follow this , unclear what his baseline might be   3. Elevated liver tests - hx of hepatic sarcoid - unclear if this is the etiology -should follow -   4. Hx of PE - on xarelto         Component  Ref Range & Units 4/28/24 0642   C-Reactive Protein  <1.00 mg/dL 17.        Component  Ref Range & Units 4/28/24 0642   CK  46 - 171 U/L 412 High                    MEDICATIONS ADMINISTERED IN LAST 1 DAY:  diphenhydrAMINE (Benadryl) cap/tab 50 mg       Date Action Dose Route User    4/28/2024 2205 Given 50 mg Oral Arya Watkins RN lic pend          HYDROcodone-acetaminophen (Norco) 5-325 MG per tab 1 tablet       Date Action Dose Route User    4/28/2024 2205 Given 1 tablet Oral Ayra Watkins RN lic pend    4/28/2024 1505 Given 1 tablet Oral Mariana Gamboa RN          latanoprost (Xalatan) 0.005 % ophthalmic solution 1 drop       Date Action Dose Route User    4/28/2024 2205 Given 1 drop Both Eyes Arya Watkins RN lic pend          methylPREDNISolone sodium succinate (Solu-MEDROL) injection 60 mg       Date Action Dose Route User    4/29/2024 1101 Given 60 mg Intravenous Mariana Gamboa RN    4/28/2024 2205 Given 60 mg Intravenous Arya Watkins RN lic pend          NIFEdipine ER (Procardia-XL) 24 hr tab 30 mg       Date Action Dose Route User    4/29/2024 0810 Given 30 mg Oral Mariana Gamboa RN    4/28/2024 1505 Given 30 mg Oral Cooper  RUY Peña          pantoprazole (Protonix) DR tab 40 mg       Date Action Dose Route User    4/29/2024 0538 Given 40 mg Oral Arya Watkins RN lic pend          rivaroxaban (Xarelto) tab 20 mg       Date Action Dose Route User    4/29/2024 0810 Given 20 mg Oral Mariana Gamboa RN    4/28/2024 1505 Given 20 mg Oral Mariana Gamboa RN            Vitals (last day)       Date/Time Temp Pulse Resp BP SpO2 Weight O2 Device O2 Flow Rate (L/min) Who    04/29/24 0808 97.7 °F (36.5 °C) 87 22 122/84 91 % -- None (Room air) -- MG    04/29/24 0536 97.7 °F (36.5 °C) 95 18 125/78 91 % -- None (Room air) -- AR    04/28/24 2203 97.5 °F (36.4 °C) 83 18 120/80 93 % -- None (Room air) -- AR    04/28/24 1901 -- 96 -- -- -- -- -- -- GT    04/28/24 1504 98.2 °F (36.8 °C) 81 20 125/80 93 % -- None (Room air) -- MG    04/28/24 0910 97.9 °F (36.6 °C) 87 18 144/85 93 % -- None (Room air) -- MG    04/28/24 0701 -- 87 -- -- -- -- -- -- GT    04/28/24 0658 98.1 °F (36.7 °C) 91 16 133/76 94 % -- None (Room air) -- PK    04/28/24 0020 -- -- -- -- -- 313 lb 12.8 oz -- -- PK    04/28/24 0015 -- -- -- 136/82 -- -- -- -- PK

## 2024-04-29 NOTE — PLAN OF CARE
Patient a/o x4. On room air. 2 assist stand pivot. Benadryl given for itchiness prn. Norco given for pain prn. Still reports generalized weakness. No acute changes noted throughout shift. Call light within reach. Bed alarm on. Calls appropriately.     Problem: Patient Centered Care  Goal: Patient preferences are identified and integrated in the patient's plan of care  Description: Interventions:  - What would you like us to know as we care for you? I live home alone  - Provide timely, complete, and accurate information to patient/family  - Incorporate patient and family knowledge, values, beliefs, and cultural backgrounds into the planning and delivery of care  - Encourage patient/family to participate in care and decision-making at the level they choose  - Honor patient and family perspectives and choices  4/29/2024 0117 by Arya Watkins RN lic pend  Outcome: Progressing  4/29/2024 0117 by Arya Watkins RN lic pend  Outcome: Progressing     Problem: Patient/Family Goals  Goal: Patient/Family Long Term Goal  Description: Patient's Long Term Goal: Go home    Interventions:  - monitor vitals  - monitor labs  - remote tele  - See additional Care Plan goals for specific interventions  4/29/2024 0117 by Arya Watkins RN lic pend  Outcome: Progressing  4/29/2024 0117 by Arya Watkins RN lic pend  Outcome: Progressing  Goal: Patient/Family Short Term Goal  Description: Patient's Short Term Goal: Get left side back to his normal    Interventions:   - monitor labs  - monitor vitals  - give meds as scheduled   - See additional Care Plan goals for specific interventions  4/29/2024 0117 by Arya Watkins RN lic pend  Outcome: Progressing  4/29/2024 0117 by Arya Watkins RN lic pend  Outcome: Progressing     Problem: PAIN - ADULT  Goal: Verbalizes/displays adequate comfort level or patient's stated pain goal  Description: INTERVENTIONS:  - Encourage pt to monitor pain and request assistance  - Assess pain using  appropriate pain scale  - Administer analgesics based on type and severity of pain and evaluate response  - Implement non-pharmacological measures as appropriate and evaluate response  - Consider cultural and social influences on pain and pain management  - Manage/alleviate anxiety  - Utilize distraction and/or relaxation techniques  - Monitor for opioid side effects  - Notify MD/LIP if interventions unsuccessful or patient reports new pain  - Anticipate increased pain with activity and pre-medicate as appropriate  4/29/2024 0117 by Arya Watkins RN lic pend  Outcome: Progressing  4/29/2024 0117 by Arya Watkins RN lic pend  Outcome: Progressing     Problem: SAFETY ADULT - FALL  Goal: Free from fall injury  Description: INTERVENTIONS:  - Assess pt frequently for physical needs  - Identify cognitive and physical deficits and behaviors that affect risk of falls.  - [place] fall precautions as indicated by assessment.  - Educate pt/family on patient safety including physical limitations  - Instruct pt to call for assistance with activity based on assessment  - Modify environment to reduce risk of injury  - Provide assistive devices as appropriate  - Consider OT/PT consult to assist with strengthening/mobility  - Encourage toileting schedule  4/29/2024 0117 by Arya Watkins RN lic pend  Outcome: Progressing  4/29/2024 0117 by Arya Watkins RN lic pend  Outcome: Progressing

## 2024-04-29 NOTE — CM/SW NOTE
04/29/24 1100   CM/SW Referral Data   Referral Source Social Work (self-referral)   Reason for Referral Discharge planning   Informant Patient   Medical Hx   Does patient have an established PCP? No   Significant Past Medical/Mental Health Hx Sarcoidosis   Patient Info   Advanced directives? No   Information provided? No   Patient's Current Mental Status at Time of Assessment Alert;Oriented   Patient's Home Environment Condo/Apt no elevator   Number of Levels in Home 1   Number of Stair in Home 4   Patient lives with Alone   Patient Status Prior to Admission   Independent with ADLs and Mobility No   Pt. requires assistance with Ambulating;Driving   Services in place prior to admission DME/Supplies at home   Type of DME/Supplies Straight Cane   Discharge Needs   Anticipated D/C needs Subacute rehab   Services Requested   Submitted to [facility] Yes     SW self-referred to this case for discharge planning.    Pt admitted for polyarthralgia.  Pt has hx of Sarcoidosis.      Pt baseline is x1 with a cane.  Per RN, now max assist x2.    Pt worked with therapy 4/28.  Anticipated therapy need: Gradual Rehabilitative Therapy due to recent falls, below baseline.    SW met with pt bedside to discuss recommendation.    Pt on disability, uses public transportation as he is unable to drive.    SW explained PATO process and logistics of PATO.    Pt agreeable to PATO, agrees he has been weaker and needs extra help right now.    [facility] review submitted.    PATO referrals sent, pt prefers around his [place] zip code.  PASRR completed and uploaded.    Pt has MMAI plan-  [employer] auth needed for services.    PLAN: DC to PATO pending [facility]/list/choice/auth    / to remain available for support and/or discharge planning.     Romy Valdez MSW, LSW [id number]

## 2024-04-29 NOTE — PROGRESS NOTES
[facility]  part of [facility]    Progress Note    Srinivasan Shin Patient Status:  Inpatient    10/3/1970 MRN [id number]   Location [facility] 5SW/SE Attending Stalin Pang MD   Hosp Day # 2 PCP No primary care provider on file.     Chief Complaint: Weakness, joint pains    Subjective:     Constitutional: Negative.    HENT: Negative.     Eyes: Negative.    Respiratory: Negative.     Cardiovascular: Negative.    Gastrointestinal: Negative.    Endocrine: Negative.    Musculoskeletal:  Positive for joint pain.   Allergic/Immunologic: Negative.    Neurological: Negative.    Hematological: Negative.    Psychiatric/Behavioral: Negative.         Objective:   Blood pressure 122/84, pulse 87, temperature 97.7 °F (36.5 °C), temperature source Oral, resp. rate 22, height 6' (1.829 m), weight (!) 313 lb 12.8 oz (142.3 kg), SpO2 91%.  Physical Exam  Vitals and nursing note reviewed.   Constitutional:       Appearance: Normal appearance.   HENT:      Head: Normocephalic and atraumatic.   Eyes:      Pupils: Pupils are equal, round, and reactive to light.   Cardiovascular:      Rate and Rhythm: Normal rate and regular rhythm.      Pulses: Normal pulses.      Heart sounds: Normal heart sounds.   Pulmonary:      Effort: Pulmonary effort is normal.      Breath sounds: Normal breath sounds.   Abdominal:      General: Abdomen is flat. Bowel sounds are normal.      Palpations: Abdomen is soft.   Musculoskeletal:      Cervical back: Neck supple.   Skin:     General: Skin is warm.   Neurological:      General: No focal deficit present.      Mental Status: He is alert and oriented to person, place, and time.   Psychiatric:         Mood and Affect: Mood normal.         Results:   Lab Results   Component Value Date    WBC 7.0 2024    HGB 14.0 2024    HCT 44.2 2024    .0 2024    CREATSERUM 0.98 2024    BUN 21 2024     (L) 2024    K 4.6 2024    CL  103 04/29/2024    CO2 27.0 04/29/2024     (H) 04/29/2024    CA 9.8 04/29/2024    ALB 4.0 04/29/2024    ALKPHO 86 04/29/2024    BILT 0.5 04/29/2024    TP 7.5 04/29/2024    AST 38 (H) 04/29/2024    ALT 37 04/29/2024    ESRML 25 (H) 04/27/2024    CRP 17.50 (H) 04/28/2024    TROPHS 8 04/27/2024     (H) 04/28/2024       No results found.  EKG 12 Lead    Result Date: 4/28/2024  Sinus rhythm with Premature supraventricular complexes Otherwise normal ECG No previous ECGs found in [facility] Confirmed by RUPINDER SINGER, Roff ([id number]) on 4/28/2024 9:38:59 AM     Assessment & Plan:       Polyarthralgia/ Sarcoidosis flare up, on Solumedrol doing better  HTN BP stable  HLD on statin  H/O PE on Xarelto  SNIF placement  D/W RN  Time spent 35 mins      PHYSICIAN Certification of Need for Inpatient Hospitalization - Initial Certification    Patient will require inpatient services that will reasonably be expected to span two midnight's based on the clinical documentation in H+P.   Based on patients current state of illness, I anticipate that, after discharge, patient will require TBD.      PAULINO UGERRERO, MD CHASE  4/29/2024

## 2024-04-29 NOTE — PLAN OF CARE
Problem: Patient Centered Care  Goal: Patient preferences are identified and integrated in the patient's plan of care  Description: Interventions:  - What would you like us to know as we care for you? I live home alone  - Provide timely, complete, and accurate information to patient/family  - Incorporate patient and family knowledge, values, beliefs, and cultural backgrounds into the planning and delivery of care  - Encourage patient/family to participate in care and decision-making at the level they choose  - Honor patient and family perspectives and choices  Outcome: Progressing     Problem: Patient/Family Goals  Goal: Patient/Family Long Term Goal  Description: Patient's Long Term Goal: Go home  Interventions:  - monitor vitals  - monitor labs  - remote tele  - See additional Care Plan goals for specific interventions  Outcome: Progressing     Problem: PAIN - ADULT  Goal: Verbalizes/displays adequate comfort level or patient's stated pain goal  Description: INTERVENTIONS:  - Encourage pt to monitor pain and request assistance  - Assess pain using appropriate pain scale  - Administer analgesics based on type and severity of pain and evaluate response  - Implement non-pharmacological measures as appropriate and evaluate response  - Consider cultural and social influences on pain and pain management  - Manage/alleviate anxiety  - Utilize distraction and/or relaxation techniques  - Monitor for opioid side effects  - Notify MD/LIP if interventions unsuccessful or patient reports new pain  - Anticipate increased pain with activity and pre-medicate as appropriate  Outcome: Progressing     Problem: SAFETY ADULT - FALL  Goal: Free from fall injury  Description: INTERVENTIONS:  - Assess pt frequently for physical needs  - Identify cognitive and physical deficits and behaviors that affect risk of falls.  - [place] fall precautions as indicated by assessment.  - Educate pt/family on patient safety including physical  limitations  - Instruct pt to call for assistance with activity based on assessment  - Modify environment to reduce risk of injury  - Provide assistive devices as appropriate  - Consider OT/PT consult to assist with strengthening/mobility  - Encourage toileting schedule  Outcome: Progressing

## 2024-04-30 VITALS
BODY MASS INDEX: 42.5 KG/M2 | DIASTOLIC BLOOD PRESSURE: 81 MMHG | SYSTOLIC BLOOD PRESSURE: 130 MMHG | WEIGHT: 313.81 LBS | HEIGHT: 72 IN | HEART RATE: 87 BPM | OXYGEN SATURATION: 92 % | TEMPERATURE: 98 F | RESPIRATION RATE: 16 BRPM

## 2024-04-30 LAB
ACE: 63 U/L
ALDOLASE: 4.3 U/L
CCP IGG SERPL-ACNC: 1.9 U/ML (ref 0–6.9)
DSDNA IGG SERPL IA-ACNC: 9.8 IU/ML
ENA AB SER QL IA: 0.2 UG/L
ENA AB SER QL IA: NEGATIVE

## 2024-04-30 PROCEDURE — 99233 SBSQ HOSP IP/OBS HIGH 50: CPT | Performed by: INTERNAL MEDICINE

## 2024-04-30 RX ORDER — HYDROCODONE BITARTRATE AND ACETAMINOPHEN 5; 325 MG/1; MG/1
1 TABLET ORAL EVERY 6 HOURS PRN
Qty: 15 TABLET | Refills: 0 | Status: SHIPPED | OUTPATIENT
Start: 2024-04-30

## 2024-04-30 RX ORDER — PREDNISONE 20 MG/1
TABLET ORAL
Qty: 26 TABLET | Refills: 0 | Status: SHIPPED | OUTPATIENT
Start: 2024-04-30 | End: 2024-05-24

## 2024-04-30 NOTE — CONGREGATE LIVING REVIEW
[facility] Authorization    The [facility] Review Committee has reviewed this case and the patient IS APPROVED for discharge to a facility for Short Term Skilled once the following procedure is followed:     - The physician discharge instructions (contained within the RANDY note for SNF) must inlcude the below appropriate and approved COVID instructions to the facility    For questions regarding CLRC approval process, please contact the CM assigned to the case.  For questions regarding RN discharge workflow, please contact the unit Clinical Leader.

## 2024-04-30 NOTE — CM/SW NOTE
KIRSTIN followed up on discharge planning.    Pt re-evaluated by therapy today.  Pt did very well- passed stairs.    Outpatient PT recommended for continued strengthening due to chronic condition.    KIRSTIN met with pt bedside- pt sitting up in chair.  Pt is happy and relieved to discharge home when medically cleared.    KIRSTIN stated pt's insurance will cover [facility] for transportation home.  Pt wants to discharge to his girlfriend's home as it has only 5 stairs as opposed to 3 flights at his.    [facility]O referrals cancelled in A[place].    KIRSTIN called [facility] and scheduled [facility] for 6:30 PM.      PLAN: DC home w/outpatient PT via [facility] at 6:30 PM.  PCS done.    / to remain available for support and/or discharge planning.     Romy Valdez MSW, LSW [id number]

## 2024-04-30 NOTE — PROGRESS NOTES
[facility]  part of [facility]    Progress Note    Srinivasan Shin Patient Status:  Inpatient    10/3/1970 MRN [id number]   Location [facility] 5SW/SE Attending Stalin Pang MD   Hosp Day # 3 PCP No primary care provider on file.     Subjective:     Patient seen and examined today  States that his weakness is better.  His CK is now down to 412    Objective:   Blood pressure 130/81, pulse 87, temperature 97.5 °F (36.4 °C), temperature source Oral, resp. rate 16, height 6' (1.829 m), weight (!) 313 lb 12.8 oz (142.3 kg), SpO2 92%.    HEENT: Clear oropharynx, no oral ulcers, EOM intact, clear sclear, PERRLA, pleasant, no acute distress, no CAD, no neck tendnerness, good ROM,   No rashes  CVS: RRR, no murmurs  RS: CTAB, no crackles, no rhonchi  ABD: Soft Non tender, no HSM felt, BS positive  Joint exam:   Left arm - can't abduct completey - up to 60 degrees   Left leg can raise better than yeter -0 b  Right leg raising much better       Results:   Lab Results   Component Value Date    WBC 7.0 2024    HGB 14.0 2024    HCT 44.2 2024    .0 2024    CREATSERUM 0.98 2024    BUN 21 2024     (L) 2024    K 4.6 2024     2024    CO2 27.0 2024     (H) 2024    CA 9.8 2024    ALB 4.0 2024    ALKPHO 86 2024    BILT 0.5 2024    TP 7.5 2024    AST 38 (H) 2024    ALT 37 2024    ESRML 25 (H) 2024    CRP 17.50 (H) 2024    TROPHS 8 2024     (H) 2024       No results found.        Assessment & Plan:        1. Sarcoidosis -  neurologic, mediastinal/hilar LIU, cutaneous (lupus pernio), liver involvement and Right Cavernous Sinus Mass- Diagnosed in 2019; primarily ocular and neurosarcoidosis.  - now with suddenly weakness and polyarthralgia - likely a sarcoidosis flare up   - He was treated with Solumedrol 60mg iv bid   - s/p rituxin 2024   -  follow ck levels and aldolase - ck is improving, potentially myosiits involvement   -Symptoms are improving and he would like to go home.  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 He was given MTX but has cirrhosis on MRI so discontinued, May 2023, infliximab withdrawn because of anti-infliximab antibodies and low serum infliximab level combined with sarcoid progression (patient was noted to have Infliximab antibodies.  Recently hospitalized september 2023 with decreased vision of the right eye , s/p pulse steroid and 1 dose of rituximab . with significant improvement.  First dose of rituximab 10/2/2023 as inpatient  Received second dose rituximab in 10/16/2023 in infusion center  *right eye vision loss might be permanent high dose pred with substantial risk     2. Elevated creatinine - follow this , unclear what his baseline might be - now normalized  3. Elevated liver tests - hx of hepatic sarcoid - unclear if this is the etiology -should follow - improving  4. Hx of PE - on serafin Le MD  4/30/2024

## 2024-04-30 NOTE — OCCUPATIONAL THERAPY NOTE
Pt screened for occupational therapy services. Pt supervision-independent with RW level and does not require services. Recommend DC home.      Shirlene Vela OT  [facility]  Inpatient Rehabilitation  Occupational Therapy  [phone]

## 2024-04-30 NOTE — PHYSICAL THERAPY NOTE
PHYSICAL THERAPY TREATMENT/DISCHARGE NOTE - INPATIENT     Room Number: 543/543-A       Presenting Problem: sacroidosis flare       Problem List  Principal Problem:    Polyarthralgia  Active Problems:    Sarcoidosis      PHYSICAL THERAPY ASSESSMENT   Patient demonstrates excellent progress this session, goals   adequately met at this level of care . Pt demonstrates independence with bed mobility and transfers, tolerating household and community distances using a rolling walker, able to perform stairs required to enter home pt will be staying at upon discharge.    Patient verbalizes no further mobility concerns at this time. Physical Therapy to sign off at this time as patient has adequately achieved all therapy goals at this level of care to D/C safely, please re-consult if patient experiences a decline in functional status. Anticipate patient to return home with OP PT to optimize body mechanics and for fall prevention.       PLAN  PT Treatment Plan: Bed mobility;Energy conservation;Gait training  Frequency (Obs):  (DC PT)    SUBJECTIVE  \"I got up earlier today with the walker, to the bathroom and the chair.\"    OBJECTIVE  Precautions: Limb alert - left;Limb alert - right    WEIGHT BEARING RESTRICTION  none    PAIN ASSESSMENT   Ratin  Location: denies  Management Techniques: Activity promotion    BALANCE  Static Sitting: Normal  Dynamic Sitting: Normal  Static Standing: Normal  Dynamic Standing: Good    ACTIVITY TOLERANCE  Pulse: 87 (at rest, 103 with activity)  Heart Rate Source: Monitor     BP: 130/81  BP Location: Right arm  BP Method: Automatic  Patient Position: Sitting     O2 WALK  Oxygen Therapy  SPO2% on Room Air at Rest: 96  SPO2% Ambulation on Room Air: 93    AM-PAC '6-Clicks' INPATIENT SHORT FORM - BASIC MOBILITY  How much difficulty does the patient currently have...  Patient Difficulty: Turning over in bed (including adjusting bedclothes, sheets and blankets)?: None   Patient Difficulty: Sitting down  on and standing up from a chair with arms (e.g., wheelchair, bedside commode, etc.): None   Patient Difficulty: Moving from lying on back to sitting on the side of the bed?: None   How much help from another person does the patient currently need...   Help from Another: Moving to and from a bed to a chair (including a wheelchair)?: A Little   Help from Another: Need to walk in hospital room?: A Little   Help from Another: Climbing 3-5 steps with a railing?: A Little     AM-PAC Score:  Raw Score: 21   Approx Degree of Impairment: 28.97%   Standardized Score (AM-PAC Scale): 50.25   CMS Modifier (G-Code):     FUNCTIONAL ABILITY STATUS  Functional Mobility/Gait Assessment  Gait Assistance: Supervision;Modified independent  Distance (ft): 320'  Assistive Device: Rolling walker  Pattern: Compensated trendelenburg;R Decreased stance time (step-through pattern, R antalgia with increased lateral trunk flexion to R, pt reporting this is typical 2/2 hx of baseball injury to RLE, no LOB, moderate-quick pace)  Stairs: Stairs  How Many Stairs: 5  Device: 1 Rail  Assist: Supervision  Pattern: Ascend and Descend  Ascend and Descend : Step to  Rolling: independent  Supine to Sit: independent  Sit to Supine: independent  Sit to Stand: modified independent - with rolling walker from edge of bed, bedside chair, hallway chair    Additional information: Anticipate pt would continue to benefit from using a rolling walker based on body mechanics and compensated trendelenburg. Patient received seated edge of bed, agreeable to physical therapy. Vital signs monitored as noted above, no adverse symptoms and patient stable during session.     Education with patient provided verbally on physical therapy plan of care, physiological benefits of out of bed mobility, home safety, and energy conservation/activity pacing. Patient with good carryover during session. Anticipated therapy needs have decreased based on patient's functional progress.,  Treatment frequency decreased based on patient's functional progress.      The patient's Approx Degree of Impairment: 28.97% has been calculated based on documentation in the [facility] '6 clicks' Inpatient Daily Activity Short Form.  Research supports that patients with this level of impairment may benefit from no services, however anticipate patient would benefit from OP PT to optimize body mechanics and for fall prevention.  Final disposition will be made by interdisciplinary medical team.      Patient End of Session: Up in chair;Needs met;Call light within reach;RN aware of session/findings;All patient questions and concerns addressed    CURRENT GOALS   Goals to be met by: 5/5/2024  Patient Goal Patient's self-stated goal is: to get stronger   Goal #1 Patient is able to demonstrate supine - sit EOB @ level: independent      Goal #1   Current Status  MET 4/30/24   Goal #2 Patient is able to demonstrate transfers EOB to/from [facility] at assistance level: independent with none      Goal #2  Current Status  MET 4/30/24   Goal #3 Patient is able to ambulate 400 feet with assist device: walker - rolling at assistance level: modified independent   Goal #3   Current Status  ADEQUATELY MET 4/30/24   Goal #4 Patient will negotiate 4 stairs/one curb w/ assistive device and supervision   Goal #4   Current Status  MET 4/30/24   Goal #5 Patient to demonstrate independence with home activity/exercise instructions provided to patient in preparation for discharge.   Goal #5   Current Status  ADEQUATELY MET 4/30/24   Goal #6     Goal #6  Current Status       Gait Training: 15 minutes      Shelby Min, PT, DPT  [facility]  Rehab Services - Physical Therapy  [id number]

## 2024-04-30 NOTE — DISCHARGE SUMMARY
[facility]  part of [facility]    Discharge Summary    Srinivasan Shin Patient Status:  Inpatient    10/3/1970 MRN [id number]   Location [facility] 5SW/SE Attending Stalin Pang MD   Hosp Day # 3 PCP No primary care provider on file.     Date of Admission: 2024   Date of Discharge: 2024    Admitting Diagnosis: Sarcoidosis [D86.9]  Polyarthralgia [M25.50]    Disposition: Home    Discharge Diagnosis: .Principal Problem:    Polyarthralgia  Active Problems:    Sarcoidosis      Hospital Course:   Reason for Admission: Gen weakness, Polyarthralgia's    Discharge Physical Exam: General appearance: alert, appears stated age, and cooperative  Head: Normocephalic, without obvious abnormality, atraumatic  Pulmonary:  clear to auscultation bilaterally  Cardiovascular: S1, S2 normal, no murmur, click, rub or gallop, regular rate and rhythm, S1, S2 normal  Abdominal: soft, non-tender; bowel sounds normal; no masses,  no organomegaly  Extremities: extremities normal, atraumatic, no cyanosis or edema  Pulses: 2+ and symmetric  Skin: Skin color, texture, turgor normal. No rashes or lesions  Neurologic: Grossly normal    Hospital Course: Pt seen by rheumatology for sarcoidosis flare up started on solumedrol doing better DC home wit outpatient PT/OT    Complications: None    Consultants         Provider   Role Specialty     Stalin Pang MD      Consulting Physician Internal Medicine     Ruperto Aguilera MD      Consulting Physician RHEUMATOLOGY                Discharge Plan:   Discharge Condition: Stable    Current Discharge Medication List        New Orders    Details   HYDROcodone-acetaminophen 5-325 MG Oral Tab Take 1 tablet by mouth every 6 (six) hours as needed.           Home Meds - Unchanged    Details   pantoprazole 40 MG Oral Tab EC Take 1 tablet (40 mg total) by mouth every morning before breakfast.      rivaroxaban (XARELTO) 20 MG Oral Tab Take 1 tablet (20 mg total) by  mouth daily with food.      Acetaminophen 500 MG Oral Cap Take 2 capsules (1,000 mg total) by mouth every 8 (eight) hours as needed for Pain.      latanoprost 0.005 % Ophthalmic Solution Place 1 drop into both eyes nightly.      NIFEdipine ER 30 MG Oral Tablet 24 Hr Take 1 tablet (30 mg total) by mouth daily.                 Discharge Diet: As tolerated    Discharge Activity: As tolerated    Follow up:     Time spent 35 mins        PAULINO GUERRERO, MD [facility]  4/30/2024

## 2024-04-30 NOTE — PLAN OF CARE
Patient a/o x4. On room air. Ambulates 1xwalker to rolling chair. Sister at bedside. IV solumedrol cont. Norco given for pain prn. Benadryl given for itchiness prn. No acute changes throughout shift. Call light within reach. Bed alarm on. Calls appropriately.     Problem: Patient Centered Care  Goal: Patient preferences are identified and integrated in the patient's plan of care  Description: Interventions:  - What would you like us to know as we care for you? I live home alone  - Provide timely, complete, and accurate information to patient/family  - Incorporate patient and family knowledge, values, beliefs, and cultural backgrounds into the planning and delivery of care  - Encourage patient/family to participate in care and decision-making at the level they choose  - Honor patient and family perspectives and choices  Outcome: Progressing      Problem: PAIN - ADULT  Goal: Verbalizes/displays adequate comfort level or patient's stated pain goal  Description: INTERVENTIONS:  - Encourage pt to monitor pain and request assistance  - Assess pain using appropriate pain scale  - Administer analgesics based on type and severity of pain and evaluate response  - Implement non-pharmacological measures as appropriate and evaluate response  - Consider cultural and social influences on pain and pain management  - Manage/alleviate anxiety  - Utilize distraction and/or relaxation techniques  - Monitor for opioid side effects  - Notify MD/LIP if interventions unsuccessful or patient reports new pain  - Anticipate increased pain with activity and pre-medicate as appropriate  Outcome: Progressing     Problem: SAFETY ADULT - FALL  Goal: Free from fall injury  Description: INTERVENTIONS:  - Assess pt frequently for physical needs  - Identify cognitive and physical deficits and behaviors that affect risk of falls.  - [place] fall precautions as indicated by assessment.  - Educate pt/family on patient safety including physical  limitations  - Instruct pt to call for assistance with activity based on assessment  - Modify environment to reduce risk of injury  - Provide assistive devices as appropriate  - Consider OT/PT consult to assist with strengthening/mobility  - Encourage toileting schedule  Outcome: Progressing

## 2024-04-30 NOTE — PLAN OF CARE
Patient okay to DC home this evening. IV removed, tele off, hub notified. AVS reviewed with patient. No questions or concerns at this time. [facility] to transport patient home. Patient's paperwork and belongings sent with [facility].   Problem: Patient Centered Care  Goal: Patient preferences are identified and integrated in the patient's plan of care  Description: Interventions:  - What would you like us to know as we care for you? I live home alone  - Provide timely, complete, and accurate information to patient/family  - Incorporate patient and family knowledge, values, beliefs, and cultural backgrounds into the planning and delivery of care  - Encourage patient/family to participate in care and decision-making at the level they choose  - Honor patient and family perspectives and choices  Outcome: Adequate for Discharge     Problem: Patient/Family Goals  Goal: Patient/Family Long Term Goal  Description: Patient's Long Term Goal: Go home    Interventions:  - monitor vitals  - monitor labs  - remote tele  - See additional Care Plan goals for specific interventions  Outcome: Adequate for Discharge    - See additional Care Plan goals for specific interventions  Outcome: Adequate for Discharge     Problem: PAIN - ADULT  Goal: Verbalizes/displays adequate comfort level or patient's stated pain goal  Description: INTERVENTIONS:  - Encourage pt to monitor pain and request assistance  - Assess pain using appropriate pain scale  - Administer analgesics based on type and severity of pain and evaluate response  - Implement non-pharmacological measures as appropriate and evaluate response  - Consider cultural and social influences on pain and pain management  - Manage/alleviate anxiety  - Utilize distraction and/or relaxation techniques  - Monitor for opioid side effects  - Notify MD/LIP if interventions unsuccessful or patient reports new pain  - Anticipate increased pain with activity and pre-medicate as  appropriate  Outcome: Adequate for Discharge     Problem: SAFETY ADULT - FALL  Goal: Free from fall injury  Description: INTERVENTIONS:  - Assess pt frequently for physical needs  - Identify cognitive and physical deficits and behaviors that affect risk of falls.  - [place] fall precautions as indicated by assessment.  - Educate pt/family on patient safety including physical limitations  - Instruct pt to call for assistance with activity based on assessment  - Modify environment to reduce risk of injury  - Provide assistive devices as appropriate  - Consider OT/PT consult to assist with strengthening/mobility  - Encourage toileting schedule  Outcome: Adequate for Discharge

## 2024-04-30 NOTE — PAYOR COMM NOTE
--------------  CONTINUED STAY REVIEW    Payor: [facility]  Subscriber #:  [id number]  Authorization Number: [id number]    Admit date: 4/27/24  Admit time: 11:02 PM    REVIEW DOCUMENTATION:  4-29-24     still has trouble with his elft arm and left leg - still weaker than right     HEENT: Clear oropharynx, no oral ulcers, EOM intact, clear sclear, PERRLA, pleasant, no acute distress, no CAD, no neck tendnerness, good ROM,   No rashes  CVS: RRR, no murmurs  RS: CTAB, no crackles, no rhonchi  ABD: Soft Non tender, no HSM felt, BS positive  Joint exam:   Left arm - can't abduct completey - up to 60 degrees   Left leg can raise better than yeter -0 b  Right leg raising much better     CREATSERUM 0.98 04/29/2024      BUN 21 04/29/2024      (L) 04/29/2024     K 4.6 04/29/2024      04/29/2024     CO2 27.0 04/29/2024      (H) 04/29/2024     CA 9.8 04/29/2024     ALB 4.0 04/29/2024     ALKPHO 86 04/29/2024     BILT 0.5 04/29/2024     TP 7.5 04/29/2024     AST 38 (H) 04/29/2024     ALT 37 04/29/2024      Assessment & Plan:  1. Sarcoidosis -  neurologic, mediastinal/hilar LIU, cutaneous (lupus pernio), liver involvement and Right Cavernous Sinus Mass- Diagnosed in 2019; primarily ocular and neurosarcoidosis.  - now with suddenly weakness and polyarthralgia - likely a sarcoidosis flare up   - cont. Solumedrol 60mg iv bid   - s/p rituxin 4/17/2024   - follow ck levels and aldolase - ck is improving, potentially myosiits involvement   - will have to consider neurologic flare as well - but he currently has no headache, no change in vision (poor vision in right eye) and improvement of his mobility - he can raise his left leg will need to consider mri lumbar spine or mri head - to eval this further - but can give one more day on steroids   - pruritus might be from the sarcoid - will cont. 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 He was given MTX but has cirrhosis on MRI so discontinued, May 2023, infliximab withdrawn because of anti-infliximab antibodies and low serum infliximab level combined with sarcoid progression (patient was noted to have Infliximab antibodies.  Recently hospitalized september 2023 with decreased vision of the right eye , s/p pulse steroid and 1 dose of rituximab . with significant improvement.  First dose of rituximab 10/2/2023 as inpatient  Received second dose rituximab in 10/16/2023 in infusion center  *right eye vision loss might be permanent high dose pred with substantial risk     2. Elevated creatinine - follow this , unclear what his baseline might be - now normalized  3. Elevated liver tests - hx of hepatic sarcoid - unclear if this is the etiology -should follow - improving  4. Hx of PE - on xarelto        MEDICATIONS ADMINISTERED IN LAST 1 DAY:  diphenhydrAMINE (Benadryl) cap/tab 50 mg       Date Action Dose Route User    4/29/2024 2207 Given 50 mg Oral Arya Watkins RN lic pend          HYDROcodone-acetaminophen (Norco) 5-325 MG per tab 1 tablet       Date Action Dose Route User    4/29/2024 2207 Given 1 tablet Oral Arya Watkins RN lic pend    4/29/2024 1658 Given 1 tablet Oral Mariana Gamboa RN          latanoprost (Xalatan) 0.005 % ophthalmic solution 1 drop       Date Action Dose Route User    4/29/2024 2207 Given 1 drop Both Eyes Arya Watkins RN lic pend          methylPREDNISolone sodium succinate (Solu-MEDROL) injection 60 mg       Date Action Dose Route User    4/29/2024 2207 Given 60 mg Intravenous Arya Watkins RN lic pend    4/29/2024 1101 Given 60 mg Intravenous Mariana Gamboa RN          NIFEdipine ER (Procardia-XL) 24 hr tab 30 mg       Date Action Dose Route User    4/29/2024 0810 Given 30 mg Oral Mariana Gamboa RN          pantoprazole (Protonix) DR tab 40 mg       Date  Action Dose Route User    4/30/2024 0540 Given 40 mg Oral Arya Watkins, RUY lic pend          rivaroxaban (Xarelto) tab 20 mg       Date Action Dose Route User    4/29/2024 0810 Given 20 mg Oral Mariana Gamboa, RN            Vitals (last day)       Date/Time Temp Pulse Resp BP SpO2 Weight O2 Device O2 Flow Rate (L/min) [facility]    04/30/24 0514 97.7 °F (36.5 °C) 79 18 127/79 92 % -- None (Room air) -- AR    04/29/24 2202 97.5 °F (36.4 °C) 83 18 116/79 92 % -- None (Room air) -- AR    04/29/24 1900 -- 87 -- -- -- -- -- -- CY    04/29/24 1652 98 °F (36.7 °C) 88 20 122/82 91 % -- None (Room air) -- MG    04/29/24 0808 97.7 °F (36.5 °C) 87 22 122/84 91 % -- None (Room air) -- MG    04/29/24 0536 97.7 °F (36.5 °C) 95 18 125/78 91 % -- None (Room air) -- AR

## 2024-04-30 NOTE — DISCHARGE INSTRUCTIONS
Prednisone taper instructions: Take 3 tablets (60 mg total) by mouth daily for 3 days, THEN 2 tablets (40 mg total) daily for 3 days, THEN 1 tablet (20 mg total) daily for 3 days, THEN 0.5 tablets (10 mg total) daily until patient sees his rheumatologist

## 2024-05-01 ENCOUNTER — PATIENT OUTREACH (OUTPATIENT)
Dept: CASE MANAGEMENT | Age: 54
End: 2024-05-01

## 2024-05-01 NOTE — PROGRESS NOTES
TCM chart review.  No TCM as patient follows with outside [facility] PCP.  Encounter closing.

## 2024-05-01 NOTE — CDS QUERY
Potential for Imbalanced Nutrition  CLINICAL DOCUMENTATION CLARIFICATION FORM    Dear Doctor Poly:  Clinical information (provided below) suggests an elevated Body Mass Index  PLEASE (X) ALL DIAGNOSES THAT APPLY TO A BMI OF ____________  SELECTION BY PROVIDER ONLY    (  x) Morbid Obesity due to Excess Calories  (  ) Severe Obesity due to Excess Calories   (  ) Obesity   (  ) Overweight   (  ) Other (please specify)     X  Documentation of BMI: 42.56    X  Documentation of Height and Weight (include if stated or measured): Ht 6',  313 lb    X  Must include ?1 clinical indicators associated with the diagnosis necessitating clarification  4/27 ED Provider note: 142.9kg/ SpO2 100%/ BMI 42.72 kg/m  Co-Morbidities :  Hypertension  4/30 Plan of Care: - Consider OT/PT consult to assist with strengthening/mobility          If you have any questions, please contact Clinical  at:[email or web address]  Thank You!    THIS FORM IS A PERMANENT PART OF THE MEDICAL RECORD